# Patient Record
Sex: FEMALE | Race: WHITE | NOT HISPANIC OR LATINO | Employment: PART TIME | URBAN - METROPOLITAN AREA
[De-identification: names, ages, dates, MRNs, and addresses within clinical notes are randomized per-mention and may not be internally consistent; named-entity substitution may affect disease eponyms.]

---

## 2018-09-20 ENCOUNTER — OFFICE VISIT (OUTPATIENT)
Dept: FAMILY MEDICINE | Facility: CLINIC | Age: 23
End: 2018-09-20
Payer: COMMERCIAL

## 2018-09-20 VITALS
OXYGEN SATURATION: 93 % | HEART RATE: 114 BPM | SYSTOLIC BLOOD PRESSURE: 126 MMHG | WEIGHT: 190.25 LBS | HEIGHT: 59 IN | TEMPERATURE: 100 F | BODY MASS INDEX: 38.36 KG/M2 | DIASTOLIC BLOOD PRESSURE: 80 MMHG

## 2018-09-20 DIAGNOSIS — J32.9 CHRONIC SINUSITIS, UNSPECIFIED LOCATION: Primary | ICD-10-CM

## 2018-09-20 DIAGNOSIS — Z88.2 ALLERGY TO SULFA DRUGS: ICD-10-CM

## 2018-09-20 PROCEDURE — 99203 OFFICE O/P NEW LOW 30 MIN: CPT | Mod: 25,S$GLB,, | Performed by: FAMILY MEDICINE

## 2018-09-20 PROCEDURE — 99999 PR PBB SHADOW E&M-NEW PATIENT-LVL III: CPT | Mod: PBBFAC,,, | Performed by: FAMILY MEDICINE

## 2018-09-20 PROCEDURE — 96372 THER/PROPH/DIAG INJ SC/IM: CPT | Mod: S$GLB,,, | Performed by: FAMILY MEDICINE

## 2018-09-20 PROCEDURE — 3008F BODY MASS INDEX DOCD: CPT | Mod: CPTII,S$GLB,, | Performed by: FAMILY MEDICINE

## 2018-09-20 RX ORDER — AMOXICILLIN AND CLAVULANATE POTASSIUM 875; 125 MG/1; MG/1
1 TABLET, FILM COATED ORAL EVERY 12 HOURS
Qty: 20 TABLET | Refills: 0 | Status: SHIPPED | OUTPATIENT
Start: 2018-09-20

## 2018-09-20 RX ORDER — BENZONATATE 200 MG/1
200 CAPSULE ORAL 3 TIMES DAILY PRN
Qty: 20 CAPSULE | Refills: 1 | Status: SHIPPED | OUTPATIENT
Start: 2018-09-20 | End: 2018-09-30

## 2018-09-20 RX ORDER — LEVOTHYROXINE SODIUM 75 UG/1
75 TABLET ORAL DAILY
COMMUNITY

## 2018-09-20 RX ORDER — BETAMETHASONE SODIUM PHOSPHATE AND BETAMETHASONE ACETATE 3; 3 MG/ML; MG/ML
6 INJECTION, SUSPENSION INTRA-ARTICULAR; INTRALESIONAL; INTRAMUSCULAR; SOFT TISSUE
Status: COMPLETED | OUTPATIENT
Start: 2018-09-20 | End: 2018-09-20

## 2018-09-20 RX ORDER — FLUTICASONE PROPIONATE 50 MCG
1 SPRAY, SUSPENSION (ML) NASAL DAILY
Qty: 1 BOTTLE | Refills: 0 | Status: SHIPPED | OUTPATIENT
Start: 2018-09-20 | End: 2018-10-17 | Stop reason: SDUPTHER

## 2018-09-20 RX ADMIN — BETAMETHASONE SODIUM PHOSPHATE AND BETAMETHASONE ACETATE 6 MG: 3; 3 INJECTION, SUSPENSION INTRA-ARTICULAR; INTRALESIONAL; INTRAMUSCULAR; SOFT TISSUE at 11:09

## 2018-09-20 NOTE — LETTER
September 20, 2018      [facility]  [address]  [facility] [address]  Phone: [phone]  Fax: [phone]       Patient: Anita Sanders   YOB: 1995  Date of Visit: 09/20/2018    To Whom It May Concern:    Von Sanders  was at [facility] on 09/20/2018. She may return to work on 09/21/18 with no restrictions. If you have any questions or concerns, or if I can be of further assistance, please do not hesitate to contact me.    Sincerely,    Juanita Shay MA

## 2018-09-20 NOTE — PROGRESS NOTES
Subjective:       Patient ID: Anita Sanders is a 23 y.o. female.    Chief Complaint: Sinus Problem; Rash; Fever; Dizziness; and Cough      HPI Comments:       Current Outpatient Medications:     levothyroxine (SYNTHROID) 75 MCG tablet, Take 75 mcg by mouth once daily., Disp: , Rfl:     amoxicillin-clavulanate 875-125mg (AUGMENTIN) 875-125 mg per tablet, Take 1 tablet by mouth every 12 (twelve) hours., Disp: 20 tablet, Rfl: 0    benzonatate (TESSALON) 200 MG capsule, Take 1 capsule (200 mg total) by mouth 3 (three) times daily as needed for Cough., Disp: 20 capsule, Rfl: 1    fluticasone (FLONASE) 50 mcg/actuation nasal spray, 1 spray (50 mcg total) by Each Nare route once daily., Disp: 1 Bottle, Rfl: 0  No current facility-administered medications for this visit.       This is the 1st time here.  She is a never smoker.    She complains of ongoing sinus symptoms and cough.  Was treated about 10 days ago at an after hours clinic for sinus infection with Bactrim.  Also was given 5 days of prednisone.  Did a bit better for awhile but never stop coughing.  Now she has got dark green sputum and clear copious nasal discharge. Low-grade fever this morning of 100.  No sore throat or GI symptoms.    In addition, this morning she woke up with a diffuse body rash, nonpruritic.  No history of medication allergies.  No history of skin problems or rashes.  She took her most recent dose of Bactrim last night    Also has been using over-the-counter sinus medication recently as well as nasal saline.  Can't tolerate liquid medications.    Gets seasonal allergies in usually at the end of a flare has a sinus infection about twice a year.      Review of Systems   Constitutional: Positive for fever. Negative for activity change, appetite change and chills.   HENT: Positive for congestion, postnasal drip, rhinorrhea and sinus pressure. Negative for sore throat.    Respiratory: Positive for cough. Negative for shortness of breath  "and wheezing.    Cardiovascular: Negative for chest pain.   Gastrointestinal: Negative for abdominal pain, diarrhea and nausea.   Genitourinary: Negative for difficulty urinating.   Musculoskeletal: Negative for arthralgias and myalgias.   Neurological: Negative for dizziness and headaches.   Hematological: Negative for adenopathy.       Objective:      Vitals:    09/20/18 1107   BP: 126/80   Pulse: (!) 114   Temp: 99.8 °F (37.7 °C)   SpO2: (!) 93%   Weight: 86.3 kg (190 lb 4.1 oz)   Height: 4' 11" (1.499 m)   PainSc:   4     Physical Exam   Constitutional: She is oriented to person, place, and time. She appears well-developed and well-nourished.  Non-toxic appearance. She appears ill. No distress.   HENT:   Head: Normocephalic.   Right Ear: Tympanic membrane, external ear and ear canal normal.   Left Ear: Tympanic membrane, external ear and ear canal normal.   Nose: Mucosal edema and rhinorrhea present. Right sinus exhibits maxillary sinus tenderness. Left sinus exhibits maxillary sinus tenderness. Left sinus exhibits no frontal sinus tenderness.   Mouth/Throat: Mucous membranes are normal. Posterior oropharyngeal edema present. No oropharyngeal exudate or posterior oropharyngeal erythema.   Neck: Neck supple. No thyromegaly present.   Cardiovascular: Normal rate, regular rhythm and normal heart sounds.   No murmur heard.  Pulmonary/Chest: Effort normal and breath sounds normal. She has no wheezes. She has no rales.   Abdominal: Soft. She exhibits no distension.   Musculoskeletal: She exhibits no edema.   Lymphadenopathy:     She has no cervical adenopathy.   Neurological: She is alert and oriented to person, place, and time.   Skin: Skin is warm and dry. She is not diaphoretic.   Psychiatric: She has a normal mood and affect. Her behavior is normal. Judgment and thought content normal.   Nursing note and vitals reviewed.      Assessment:       1. Chronic sinusitis, unspecified location    2. Allergy to sulfa drugs "        Plan:   Chronic sinusitis, unspecified location  Comments:  Discontinued Bactrim.  Augmentin times 10 days.  [facility] IM, Tessalon for cough.  Start Flonase.  Fluids and rest  Orders:  -     betamethasone acetate-betamethasone sodium phosphate injection 6 mg; Inject 1 mL (6 mg total) into the muscle one time.    Allergy to sulfa drugs  Comments:  Likely cause of rash.  Discontinue medication    Other orders  -     amoxicillin-clavulanate 875-125mg (AUGMENTIN) 875-125 mg per tablet; Take 1 tablet by mouth every 12 (twelve) hours.  Dispense: 20 tablet; Refill: 0  -     fluticasone (FLONASE) 50 mcg/actuation nasal spray; 1 spray (50 mcg total) by Each Nare route once daily.  Dispense: 1 Bottle; Refill: 0  -     benzonatate (TESSALON) 200 MG capsule; Take 1 capsule (200 mg total) by mouth 3 (three) times daily as needed for Cough.  Dispense: 20 capsule; Refill: 1

## 2018-10-17 RX ORDER — FLUTICASONE PROPIONATE 50 MCG
1 SPRAY, SUSPENSION (ML) NASAL DAILY
Qty: 16 ML | Refills: 0 | Status: SHIPPED | OUTPATIENT
Start: 2018-10-17